# Patient Record
Sex: MALE | Race: WHITE | NOT HISPANIC OR LATINO | Employment: FULL TIME | URBAN - METROPOLITAN AREA
[De-identification: names, ages, dates, MRNs, and addresses within clinical notes are randomized per-mention and may not be internally consistent; named-entity substitution may affect disease eponyms.]

---

## 2020-09-30 ENCOUNTER — TRANSFERRED RECORDS (OUTPATIENT)
Dept: HEALTH INFORMATION MANAGEMENT | Facility: CLINIC | Age: 65
End: 2020-09-30

## 2020-10-01 ENCOUNTER — ANESTHESIA (OUTPATIENT)
Dept: SURGERY | Facility: HOSPITAL | Age: 65
End: 2020-10-01
Payer: COMMERCIAL

## 2020-10-01 ENCOUNTER — HOSPITAL ENCOUNTER (OUTPATIENT)
Facility: HOSPITAL | Age: 65
Setting detail: OBSERVATION
Discharge: HOME | End: 2020-10-02
Attending: EMERGENCY MEDICINE | Admitting: INTERNAL MEDICINE
Payer: COMMERCIAL

## 2020-10-01 ENCOUNTER — ANESTHESIA EVENT (OUTPATIENT)
Dept: SURGERY | Facility: HOSPITAL | Age: 65
End: 2020-10-01
Payer: COMMERCIAL

## 2020-10-01 DIAGNOSIS — K92.2 GASTROINTESTINAL HEMORRHAGE, UNSPECIFIED GASTROINTESTINAL HEMORRHAGE TYPE: ICD-10-CM

## 2020-10-01 PROBLEM — I25.10 CORONARY ARTERY DISEASE INVOLVING NATIVE CORONARY ARTERY WITHOUT ANGINA PECTORIS: Status: ACTIVE | Noted: 2020-10-01

## 2020-10-01 LAB
ABO + RH BLD: NORMAL
ABO + RH BLD: NORMAL
ALBUMIN SERPL-MCNC: 2.7 G/DL (ref 3.4–5)
ALP SERPL-CCNC: 34 U/L (ref 40–150)
ALT SERPL W P-5'-P-CCNC: 35 U/L (ref 0–70)
ANION GAP SERPL CALCULATED.3IONS-SCNC: 6 MMOL/L (ref 3–14)
AST SERPL W P-5'-P-CCNC: 25 U/L (ref 0–45)
BASOPHILS # BLD AUTO: 0 10E9/L (ref 0–0.2)
BASOPHILS NFR BLD AUTO: 0.3 %
BILIRUB SERPL-MCNC: 0.2 MG/DL (ref 0.2–1.3)
BLD GP AB SCN SERPL QL: NORMAL
BLD PROD TYP BPU: NORMAL
BLD UNIT ID BPU: 0
BLD UNIT ID BPU: 0
BLOOD BANK CMNT PATIENT-IMP: NORMAL
BLOOD PRODUCT CODE: NORMAL
BLOOD PRODUCT CODE: NORMAL
BPU ID: NORMAL
BPU ID: NORMAL
BUN SERPL-MCNC: 29 MG/DL (ref 7–30)
CALCIUM SERPL-MCNC: 7.3 MG/DL (ref 8.5–10.1)
CHLORIDE SERPL-SCNC: 108 MMOL/L (ref 94–109)
CO2 SERPL-SCNC: 26 MMOL/L (ref 20–32)
CREAT SERPL-MCNC: 0.64 MG/DL (ref 0.66–1.25)
DIFFERENTIAL METHOD BLD: ABNORMAL
EOSINOPHIL # BLD AUTO: 0.1 10E9/L (ref 0–0.7)
EOSINOPHIL NFR BLD AUTO: 1.6 %
ERYTHROCYTE [DISTWIDTH] IN BLOOD BY AUTOMATED COUNT: 13.3 % (ref 10–15)
GFR SERPL CREATININE-BSD FRML MDRD: >90 ML/MIN/{1.73_M2}
GLUCOSE SERPL-MCNC: 181 MG/DL (ref 70–99)
HCT VFR BLD AUTO: 22 % (ref 40–53)
HEMOCCULT SP1 STL QL: POSITIVE
HGB BLD-MCNC: 7.3 G/DL (ref 13.3–17.7)
HGB BLD-MCNC: 7.8 G/DL (ref 13.3–17.7)
HGB BLD-MCNC: 8.2 G/DL (ref 13.3–17.7)
HGB BLD-MCNC: 8.8 G/DL (ref 13.3–17.7)
IMM GRANULOCYTES # BLD: 0.1 10E9/L (ref 0–0.4)
IMM GRANULOCYTES NFR BLD: 0.6 %
INR PPP: 1.02 (ref 0.86–1.14)
LABORATORY COMMENT REPORT: NORMAL
LACTATE BLD-SCNC: 0.6 MMOL/L (ref 0.7–2)
LACTATE BLD-SCNC: 2.7 MMOL/L (ref 0.7–2)
LYMPHOCYTES # BLD AUTO: 2.8 10E9/L (ref 0.8–5.3)
LYMPHOCYTES NFR BLD AUTO: 32.1 %
MCH RBC QN AUTO: 31.1 PG (ref 26.5–33)
MCHC RBC AUTO-ENTMCNC: 33.2 G/DL (ref 31.5–36.5)
MCV RBC AUTO: 94 FL (ref 78–100)
MONOCYTES # BLD AUTO: 0.8 10E9/L (ref 0–1.3)
MONOCYTES NFR BLD AUTO: 8.8 %
NEUTROPHILS # BLD AUTO: 4.9 10E9/L (ref 1.6–8.3)
NEUTROPHILS NFR BLD AUTO: 56.6 %
NRBC # BLD AUTO: 0 10*3/UL
NRBC BLD AUTO-RTO: 0 /100
NUM BPU REQUESTED: 2
PLATELET # BLD AUTO: 293 10E9/L (ref 150–450)
POTASSIUM SERPL-SCNC: 3.8 MMOL/L (ref 3.4–5.3)
PROT SERPL-MCNC: 5.1 G/DL (ref 6.8–8.8)
RBC # BLD AUTO: 2.35 10E12/L (ref 4.4–5.9)
SARS-COV-2 RNA SPEC QL NAA+PROBE: NEGATIVE
SARS-COV-2 RNA SPEC QL NAA+PROBE: NORMAL
SODIUM SERPL-SCNC: 140 MMOL/L (ref 133–144)
SPECIMEN EXP DATE BLD: NORMAL
SPECIMEN SOURCE: NORMAL
SPECIMEN SOURCE: NORMAL
TRANSFUSION STATUS PATIENT QL: NORMAL
WBC # BLD AUTO: 8.7 10E9/L (ref 4–11)

## 2020-10-01 PROCEDURE — G0378 HOSPITAL OBSERVATION PER HR: HCPCS

## 2020-10-01 PROCEDURE — 86900 BLOOD TYPING SEROLOGIC ABO: CPT | Performed by: EMERGENCY MEDICINE

## 2020-10-01 PROCEDURE — 258N000003 HC RX IP 258 OP 636: Performed by: SURGERY

## 2020-10-01 PROCEDURE — 86920 COMPATIBILITY TEST SPIN: CPT | Performed by: EMERGENCY MEDICINE

## 2020-10-01 PROCEDURE — 93005 ELECTROCARDIOGRAM TRACING: CPT | Mod: 59

## 2020-10-01 PROCEDURE — 250N000011 HC RX IP 250 OP 636: Performed by: SURGERY

## 2020-10-01 PROCEDURE — C9113 INJ PANTOPRAZOLE SODIUM, VIA: HCPCS | Performed by: INTERNAL MEDICINE

## 2020-10-01 PROCEDURE — 85025 COMPLETE CBC W/AUTO DIFF WBC: CPT | Performed by: EMERGENCY MEDICINE

## 2020-10-01 PROCEDURE — P9016 RBC LEUKOCYTES REDUCED: HCPCS | Performed by: EMERGENCY MEDICINE

## 2020-10-01 PROCEDURE — 86901 BLOOD TYPING SEROLOGIC RH(D): CPT | Performed by: EMERGENCY MEDICINE

## 2020-10-01 PROCEDURE — 43239 EGD BIOPSY SINGLE/MULTIPLE: CPT | Performed by: NURSE ANESTHETIST, CERTIFIED REGISTERED

## 2020-10-01 PROCEDURE — 250N000011 HC RX IP 250 OP 636: Performed by: INTERNAL MEDICINE

## 2020-10-01 PROCEDURE — 43235 EGD DIAGNOSTIC BRUSH WASH: CPT | Performed by: SURGERY

## 2020-10-01 PROCEDURE — 36415 COLL VENOUS BLD VENIPUNCTURE: CPT | Performed by: INTERNAL MEDICINE

## 2020-10-01 PROCEDURE — 99220 PR INITIAL OBSERVATION CARE,LEVEL III: CPT | Mod: 25 | Performed by: INTERNAL MEDICINE

## 2020-10-01 PROCEDURE — 87635 SARS-COV-2 COVID-19 AMP PRB: CPT | Performed by: EMERGENCY MEDICINE

## 2020-10-01 PROCEDURE — C9113 INJ PANTOPRAZOLE SODIUM, VIA: HCPCS | Performed by: SURGERY

## 2020-10-01 PROCEDURE — 99203 OFFICE O/P NEW LOW 30 MIN: CPT | Mod: 25 | Performed by: SURGERY

## 2020-10-01 PROCEDURE — 99285 EMERGENCY DEPT VISIT HI MDM: CPT | Mod: 25

## 2020-10-01 PROCEDURE — 200N000001 HC R&B ICU

## 2020-10-01 PROCEDURE — 272N000001 HC OR GENERAL SUPPLY STERILE: Performed by: SURGERY

## 2020-10-01 PROCEDURE — 86850 RBC ANTIBODY SCREEN: CPT | Performed by: EMERGENCY MEDICINE

## 2020-10-01 PROCEDURE — 83605 ASSAY OF LACTIC ACID: CPT | Performed by: INTERNAL MEDICINE

## 2020-10-01 PROCEDURE — 370N000002 HC ANESTHESIA TECHNICAL FEE, EACH ADDTL 15 MIN: Performed by: SURGERY

## 2020-10-01 PROCEDURE — 36430 TRANSFUSION BLD/BLD COMPNT: CPT | Mod: 59

## 2020-10-01 PROCEDURE — 85610 PROTHROMBIN TIME: CPT | Performed by: EMERGENCY MEDICINE

## 2020-10-01 PROCEDURE — 82274 ASSAY TEST FOR BLOOD FECAL: CPT | Performed by: EMERGENCY MEDICINE

## 2020-10-01 PROCEDURE — 258N000003 HC RX IP 258 OP 636: Performed by: EMERGENCY MEDICINE

## 2020-10-01 PROCEDURE — 370N000001 HC ANESTHESIA TECHNICAL FEE, 1ST 30 MIN: Performed by: SURGERY

## 2020-10-01 PROCEDURE — 80053 COMPREHEN METABOLIC PANEL: CPT | Performed by: EMERGENCY MEDICINE

## 2020-10-01 PROCEDURE — 83605 ASSAY OF LACTIC ACID: CPT | Performed by: EMERGENCY MEDICINE

## 2020-10-01 PROCEDURE — 258N000003 HC RX IP 258 OP 636: Performed by: INTERNAL MEDICINE

## 2020-10-01 PROCEDURE — 96374 THER/PROPH/DIAG INJ IV PUSH: CPT | Mod: 59

## 2020-10-01 PROCEDURE — 999N000136 HC STATISTIC PRE PROC ASSESS II: Performed by: SURGERY

## 2020-10-01 PROCEDURE — 93010 ELECTROCARDIOGRAM REPORT: CPT | Performed by: INTERNAL MEDICINE

## 2020-10-01 PROCEDURE — 258N000003 HC RX IP 258 OP 636: Performed by: NURSE ANESTHETIST, CERTIFIED REGISTERED

## 2020-10-01 PROCEDURE — 85018 HEMOGLOBIN: CPT | Mod: 91 | Performed by: INTERNAL MEDICINE

## 2020-10-01 PROCEDURE — 250N000009 HC RX 250: Performed by: NURSE ANESTHETIST, CERTIFIED REGISTERED

## 2020-10-01 PROCEDURE — 250N000011 HC RX IP 250 OP 636: Performed by: NURSE ANESTHETIST, CERTIFIED REGISTERED

## 2020-10-01 PROCEDURE — C9803 HOPD COVID-19 SPEC COLLECT: HCPCS

## 2020-10-01 PROCEDURE — 99285 EMERGENCY DEPT VISIT HI MDM: CPT | Performed by: EMERGENCY MEDICINE

## 2020-10-01 PROCEDURE — 360N000014 HC SURGERY LEVEL 2 1ST 30 MIN: Performed by: SURGERY

## 2020-10-01 PROCEDURE — 85018 HEMOGLOBIN: CPT | Performed by: INTERNAL MEDICINE

## 2020-10-01 RX ORDER — ASPIRIN 81 MG/1
81 TABLET ORAL DAILY
Status: ON HOLD | COMMUNITY
End: 2020-10-02

## 2020-10-01 RX ORDER — CHLORAL HYDRATE 500 MG
1 CAPSULE ORAL DAILY
COMMUNITY

## 2020-10-01 RX ORDER — PROPOFOL 10 MG/ML
INJECTION, EMULSION INTRAVENOUS PRN
Status: DISCONTINUED | OUTPATIENT
Start: 2020-10-01 | End: 2020-10-01

## 2020-10-01 RX ORDER — SODIUM CHLORIDE 9 MG/ML
INJECTION, SOLUTION INTRAVENOUS CONTINUOUS
Status: DISCONTINUED | OUTPATIENT
Start: 2020-10-01 | End: 2020-10-02 | Stop reason: HOSPADM

## 2020-10-01 RX ORDER — SUCRALFATE 1 G/1
1 TABLET ORAL 4 TIMES DAILY
COMMUNITY

## 2020-10-01 RX ORDER — SODIUM CHLORIDE, SODIUM LACTATE, POTASSIUM CHLORIDE, CALCIUM CHLORIDE 600; 310; 30; 20 MG/100ML; MG/100ML; MG/100ML; MG/100ML
INJECTION, SOLUTION INTRAVENOUS CONTINUOUS
Status: DISCONTINUED | OUTPATIENT
Start: 2020-10-01 | End: 2020-10-02 | Stop reason: HOSPADM

## 2020-10-01 RX ORDER — SODIUM CHLORIDE, SODIUM LACTATE, POTASSIUM CHLORIDE, CALCIUM CHLORIDE 600; 310; 30; 20 MG/100ML; MG/100ML; MG/100ML; MG/100ML
INJECTION, SOLUTION INTRAVENOUS CONTINUOUS
Status: CANCELLED | OUTPATIENT
Start: 2020-10-01

## 2020-10-01 RX ORDER — MULTIVITAMIN,THERAPEUTIC
1 TABLET ORAL DAILY
COMMUNITY

## 2020-10-01 RX ORDER — NALOXONE HYDROCHLORIDE 0.4 MG/ML
.1-.4 INJECTION, SOLUTION INTRAMUSCULAR; INTRAVENOUS; SUBCUTANEOUS
Status: CANCELLED | OUTPATIENT
Start: 2020-10-01 | End: 2020-10-02

## 2020-10-01 RX ORDER — LIDOCAINE HYDROCHLORIDE 20 MG/ML
INJECTION, SOLUTION INFILTRATION; PERINEURAL PRN
Status: DISCONTINUED | OUTPATIENT
Start: 2020-10-01 | End: 2020-10-01

## 2020-10-01 RX ORDER — SODIUM CHLORIDE 9 MG/ML
INJECTION, SOLUTION INTRAVENOUS CONTINUOUS PRN
Status: DISCONTINUED | OUTPATIENT
Start: 2020-10-01 | End: 2020-10-01

## 2020-10-01 RX ORDER — FENTANYL CITRATE 50 UG/ML
INJECTION, SOLUTION INTRAMUSCULAR; INTRAVENOUS PRN
Status: DISCONTINUED | OUTPATIENT
Start: 2020-10-01 | End: 2020-10-01

## 2020-10-01 RX ORDER — MEPERIDINE HYDROCHLORIDE 25 MG/ML
12.5 INJECTION INTRAMUSCULAR; INTRAVENOUS; SUBCUTANEOUS
Status: CANCELLED | OUTPATIENT
Start: 2020-10-01

## 2020-10-01 RX ORDER — ONDANSETRON 4 MG/1
4 TABLET, ORALLY DISINTEGRATING ORAL EVERY 30 MIN PRN
Status: CANCELLED | OUTPATIENT
Start: 2020-10-01

## 2020-10-01 RX ORDER — LIDOCAINE 40 MG/G
CREAM TOPICAL
Status: DISCONTINUED | OUTPATIENT
Start: 2020-10-01 | End: 2020-10-02 | Stop reason: HOSPADM

## 2020-10-01 RX ORDER — NIACIN 500 MG/1
500 TABLET, EXTENDED RELEASE ORAL 2 TIMES DAILY
COMMUNITY

## 2020-10-01 RX ORDER — NITROGLYCERIN 0.4 MG/1
0.4 TABLET SUBLINGUAL EVERY 5 MIN PRN
COMMUNITY

## 2020-10-01 RX ORDER — NALOXONE HYDROCHLORIDE 0.4 MG/ML
.1-.4 INJECTION, SOLUTION INTRAMUSCULAR; INTRAVENOUS; SUBCUTANEOUS
Status: DISCONTINUED | OUTPATIENT
Start: 2020-10-01 | End: 2020-10-02 | Stop reason: HOSPADM

## 2020-10-01 RX ORDER — ONDANSETRON 2 MG/ML
4 INJECTION INTRAMUSCULAR; INTRAVENOUS EVERY 30 MIN PRN
Status: CANCELLED | OUTPATIENT
Start: 2020-10-01

## 2020-10-01 RX ORDER — LIDOCAINE 40 MG/G
CREAM TOPICAL
Status: DISCONTINUED | OUTPATIENT
Start: 2020-10-01 | End: 2020-10-01

## 2020-10-01 RX ADMIN — LIDOCAINE HYDROCHLORIDE 40 MG: 20 INJECTION, SOLUTION INFILTRATION; PERINEURAL at 13:57

## 2020-10-01 RX ADMIN — SODIUM CHLORIDE: 9 INJECTION, SOLUTION INTRAVENOUS at 15:16

## 2020-10-01 RX ADMIN — PROPOFOL 50 MG: 10 INJECTION, EMULSION INTRAVENOUS at 13:59

## 2020-10-01 RX ADMIN — FENTANYL CITRATE 100 MCG: 50 INJECTION, SOLUTION INTRAMUSCULAR; INTRAVENOUS at 13:57

## 2020-10-01 RX ADMIN — PANTOPRAZOLE SODIUM 40 MG: 40 INJECTION, POWDER, FOR SOLUTION INTRAVENOUS at 12:03

## 2020-10-01 RX ADMIN — SODIUM CHLORIDE: 9 INJECTION, SOLUTION INTRAVENOUS at 13:55

## 2020-10-01 RX ADMIN — SODIUM CHLORIDE 1000 ML: 9 INJECTION, SOLUTION INTRAVENOUS at 06:01

## 2020-10-01 RX ADMIN — FENTANYL CITRATE 50 MCG: 50 INJECTION, SOLUTION INTRAMUSCULAR; INTRAVENOUS at 14:04

## 2020-10-01 ASSESSMENT — ENCOUNTER SYMPTOMS
NECK STIFFNESS: 0
ALLERGIC/IMMUNOLOGIC NEGATIVE: 1
MUSCULOSKELETAL NEGATIVE: 1
CONSTITUTIONAL NEGATIVE: 1
LIGHT-HEADEDNESS: 1
SLEEP DISTURBANCE: 0
NERVOUS/ANXIOUS: 0
PSYCHIATRIC NEGATIVE: 1
CARDIOVASCULAR NEGATIVE: 1
GASTROINTESTINAL NEGATIVE: 1
EYES NEGATIVE: 1
MYALGIAS: 0
FEVER: 0
CHILLS: 0
ENDOCRINE NEGATIVE: 1
NECK PAIN: 0
RESPIRATORY NEGATIVE: 1
PHOTOPHOBIA: 0

## 2020-10-01 ASSESSMENT — ACTIVITIES OF DAILY LIVING (ADL)
ADLS_ACUITY_SCORE: 16

## 2020-10-01 ASSESSMENT — MIFFLIN-ST. JEOR: SCORE: 1480.75

## 2020-10-01 NOTE — ED NOTES
Patient signed release of information for [facility]'s to fax yesterday's clinic records and lab results.

## 2020-10-01 NOTE — CONSULTS
"Consult  10/1/2020    Patient: Carlos Grider    Admitting Physician: Dr. Chacho Dye    Admitting diagnosis: Gastrointestinal hemorrhage, unspecified gastrointestinal hemorrhage type [K92.2]    Reason for consult: GI bleeding: Melena    This is a 65 year old male with Melena.  This has been going of for 2 day(s).  Patient does not have abdominal pain.  Patient has been lightheaded.  Patient has not passed out.        IS on anticoagulation agents    Asa 81 mg daily and Plavix     Does currently show signs of active bleeding.   IS hemodynamically stable     Has not had a CT.   Has not had a bleeding scan.   Current HGB:   Recent Labs   Lab Test 10/01/20  0916   HGB 6.5*           Does have a  history of GI bleeding in the past   Positive history of positive history of gastric ulcer   Most recent EGD: 2015    Most recent colonoscopy: 2015    Past Medical History:  Past Medical History:   Diagnosis Date     CAD (coronary artery disease)     2 stents.  Last one was placed in 2010--[facility].  Dr green.     GI bleed 2006    EGD, Colonoscopy       Past Surgical History:  No past surgical history on file.    Family History History:  No family history on file.    History of Tobacco Use:  History   Smoking Status     Never Smoker   Smokeless Tobacco     Never Used       Current Medications:  No current outpatient medications on file.       Allergies:  No Known Allergies    ROS:  Constitutional: negative  Eyes: negative  Ears, nose, mouth, throat, and face: negative  Respiratory: negative  Cardiovascular: history of stents  Gastrointestinal: anemia, melena  Genitourinary:negative  Integument/breast: negative  Hematologic/lymphatic: negative  Musculoskeletal:negative  Neurological: negative  Behvioral/Psych: negative  Endocrine: negative  Allergic/Immunologic: negative    PHYSICAL EXAM:     Vital signs: /65   Pulse 85   Temp 96.6  F (35.9  C) (Tympanic)   Resp 18   Ht 1.676 m (5' 6\")   Wt 75.8 kg (167 lb " 1.7 oz)   SpO2 96%   BMI 26.97 kg/m     BMI: Body mass index is 26.97 kg/m .   General: Normal, healthy, cooperative, in no acute distress, alert   Skin: no rashes   Lungs: clear to auscultation   CV: Regular rate and rhythm without murmur   Abdominal: abdomen is soft without significant tenderness   Extremities: No cyanosis, clubbing or edema noted bilaterally in Upper and Lower Extremities   Neurological: without deficit    CBC RESULTS:   Recent Labs   Lab Test 10/01/20  0916 10/01/20  0558 09/22/15  1104 03/29/15  0445   WBC  --  8.7 9.9 8.7   RBC  --  2.35* 4.50 2.75*   HGB 6.5* 7.3* 14.1 8.0*   HCT  --  22.0* 42.2 25.4*   MCV  --  94 94 92   MCH  --  31.1 31.3 29.1   MCHC  --  33.2 33.4 31.5   RDW  --  13.3 13.5 13.5   PLT  --  293 368 286       Last Comprehensive Metabolic Panel:  Sodium   Date Value Ref Range Status   10/01/2020 140 133 - 144 mmol/L Final     Potassium   Date Value Ref Range Status   10/01/2020 3.8 3.4 - 5.3 mmol/L Final     Chloride   Date Value Ref Range Status   10/01/2020 108 94 - 109 mmol/L Final     Carbon Dioxide   Date Value Ref Range Status   10/01/2020 26 20 - 32 mmol/L Final     Anion Gap   Date Value Ref Range Status   10/01/2020 6 3 - 14 mmol/L Final     Glucose   Date Value Ref Range Status   10/01/2020 181 (H) 70 - 99 mg/dL Final     Urea Nitrogen   Date Value Ref Range Status   10/01/2020 29 7 - 30 mg/dL Final     Creatinine   Date Value Ref Range Status   10/01/2020 0.64 (L) 0.66 - 1.25 mg/dL Final     GFR Estimate   Date Value Ref Range Status   10/01/2020 >90 >60 mL/min/[1.73_m2] Final     Comment:     Non  GFR Calc  Starting 12/18/2018, serum creatinine based estimated GFR (eGFR) will be   calculated using the Chronic Kidney Disease Epidemiology Collaboration   (CKD-EPI) equation.       Calcium   Date Value Ref Range Status   10/01/2020 7.3 (L) 8.5 - 10.1 mg/dL Final     Bilirubin Total   Date Value Ref Range Status   10/01/2020 0.2 0.2 - 1.3 mg/dL Final      Alkaline Phosphatase   Date Value Ref Range Status   10/01/2020 34 (L) 40 - 150 U/L Final     ALT   Date Value Ref Range Status   10/01/2020 35 0 - 70 U/L Final     AST   Date Value Ref Range Status   10/01/2020 25 0 - 45 U/L Final       ASSESSMENT: 65 year old male with most likely upper GI bleeding.        IS on anticoagulation agents    Asa 81 mg daily and Plavix     Does currently show signs of active bleeding.   IS hemodynamically stable    PLAN:   EGD today

## 2020-10-01 NOTE — PLAN OF CARE
[facility] Inpatient Admission Note:    Patient admitted to 3122/3122-1 at approximately 0900 via cart accompanied by nurse from emergency room . Report received from Lissy in SBAR format at 0900 via face to face in room. Patient ambulated to bed via self.. Patient is alert and oriented X 3, denies pain; rates at 0 on 0-10 scale.  Patient oriented to room, unit, hourly rounding, and plan of care. Explained admission packet and patient handbook with patient bill of rights brochure. Will continue to monitor and document as needed.     Inpatient Nursing criteria listed below was met:    Health care directives status obtained and documented: Yes    Care Everywhere authorization obtained No    MRSA swab completed for patient 65 years and older: N/A    Patient identifies a surrogate decision maker: Yes If yes, who:Ceci-wife Contact Information:[phone]     If initial lactic acid >2.0, repeat lactic acid drawn within one hour of arrival to unit: No. If no, state reason: Plan to get w/ need Hgb draw    Vaccination assessment and education completed: Yes   Vaccinations received prior to admission: Pneumovax no  Influenza(seasonal)  NO   Vaccination(s) ordered: influenza vaccine    Clergy visit ordered if patient requests: N/A    Skin issues/needs documented: N/A    Isolation Patient: no Education given, correct sign in place and documentation row added to PCS:   N/A    Fall Prevention Yes: Care plan updated, education given and documented, sticker and magnet in place: Yes    Care Plan initiated: Yes    Education Documented (including assessment): Yes    Patient has discharge needs : No If yes, please explain:N/A

## 2020-10-01 NOTE — PLAN OF CARE
Prior to Admission Medication Reconciliation:     Medications added:   [] None  [x] As listed below:    Multivitamin, fish oil and nitrostat- pt reported    Medications deleted:   [x] None  [] As listed below:    Changes made to existing medications:   [] None  [x] As listed below:    Updated strengths and frequencies    Last times/dates taken verified with patient:  [x] Yes- completed myself  [] Nurse completed no changes made  [] Unable to review with patient:  [] Nurse completed/changes made:       Allergy modifications:    [x]Did not review  []Patient verified NKA  []Patient verified current existing allergies: no changes made  []New allergies: listed below      Medication reconciliation sources:   [x]Patient  []Patient family member/emergency contact: **  [x][facility] Review:    Home Medications     - Last Reconciled 09/30/20 by Thu Browning CMA    [x]amlodipine 10 mg tablet 10 mg PO DAILY   [x]aspirin 81 mg tablet,delayed release (Adult Low Dose Aspirin) 81 mg PO DAILY   [x]clopidogrel 75 mg tablet 75 mg PO DAILY   [x]multivitamin-minerals-lutein  1 tab PO DAILY   [x]niacin 500 mg tablet,extended release 24 hr (Niaspan) 500 mg PO BID   [x]nitroglycerin 0.4 mg sublingual tablet 0.4 mg sublingual Q5M PRN   [x]pantoprazole 40 mg tablet,delayed release 40 mg PO DAILY   [x]rosuvastatin 10 mg tablet 10 mg PO DAILY   [x]sucralfate 1 gram tablet TAKE ONE TABLET BY MOUTH 4 TIMES DAILY ON EMPTY STOMACH Members ID # [id number]   []testosterone 1.62 % (40.5 mg/2.5 gram) transdermal gel packet  1 packet transdermal QAM   [x]testosterone 1.62 % (20.25 mg/1.25 gram) transdermal gel packet (AndroGel)  1 packet transdermal QAM 90 days   [x]valsartan 80 mg tablet n80 mg PO DAILY   []Epic Chart Review  []Care Everywhere review  [x]Pharmacy med list: [facility] Mail order- [facility]    Name Strength Instructions Last Fill Date QTY/DS Notes   [] rosuvastatin 10 mg  daily 9/25/20 90    [] clopidogrel 75 mg daily 9/25/20 90    []  pantoprazole 40 mg daily 9/4/20 90    [] amlodipine 10mg daily 9/4/20 90    [] sucralfate 1 gm QID on empty stomach 9/4/20 360    [] valsartan 80 mg daily 7/22/20 90    [] Testosterone gel 1.62%,  1 packet qam 7/8/20 90 ds    [] Niacin er 500 mg bid 7/15/20 180    []Pharmacy phone call  []Outside meds dispense report  []Nursing home or Assisted Living MAR:  []Other: **    Pharmacy desired at discharge: [facility]    Is patient on coumadin?  [x]No      Requests for consultation by provider or pharmacist:   [x] Patient understands why all of their meds were prescribed and how to take them. No questions.   [x] Fill dates coincide with compliancy for all maintenance meds.   [] Fill dates do not coincide with compliancy with maintenance meds. See notes in PTA medlist about how patient is taking.   [] Patient has questions about the following:      Comments: pt alert and oriented and manages his own meds. No concerns. Denies taking any other medications aside from those listed on his PTA meds on a daily basis.       Aliyah Vasquez on 10/1/2020 at 10:31 AM       Discrepancies: [x] No []Not Applicable []Yes: listed below    Time spent on medication reconciliation:   []5-20 mins  [x]20-40 mins  []> 40 mins    Issues completing PTA medication reconciliation:  [x] On hold for a long time  [] Waited for a call back  [] Fax didn't come through  [] Fax took a long time  [] Other:    Notifying appropriate party of changes/additions/discrepancies:  [x]No pertinent changes made, notification not necessary.   [] Notified attending provider via text page  [] Notified attending provider in person  [] Notified pharmacy  [] Notified nurse  [] Attending provider not available, left detailed notes  [] Changes/additions made don't need provider notification because provider has not seen patient or input any orders  [] Changes/additions made don't need provider notification because changes made are to medications not ordered    Medications Prior  to Admission   Medication Sig Dispense Refill Last Dose     amLODIPine (NORVASC) 10 MG tablet Take 10 mg by mouth daily    10/1/2020 at 0515     aspirin 81 MG EC tablet Take 81 mg by mouth daily   9/30/2020 at 0515     clopidogrel (PLAVIX) 75 MG tablet Take 75 mg by mouth daily    10/1/2020 at 0515     fish oil-omega-3 fatty acids 1000 MG capsule Take 1 g by mouth daily   9/30/2020 at 0515     multivitamin, therapeutic (THERA-VIT) TABS tablet Take 1 tablet by mouth daily   9/30/2020 at Unknown time     niacin ER (NIASPAN) 500 MG CR tablet Take 500 mg by mouth 2 times daily   9/30/2020 at 2200     pantoprazole (PROTONIX) 40 MG EC tablet Take 40 mg by mouth every morning (before breakfast)    9/30/2020 at 0515     rosuvastatin (CRESTOR) 10 MG tablet Take 10 mg by mouth daily    9/30/2020 at 2100     sucralfate (CARAFATE) 1 GM tablet Take 1 g by mouth 4 times daily on an empty stomach   9/30/2020 at 2100     Testosterone 20.25 MG/1.25GM (1.62%) GEL Place 1 packet onto the skin daily    9/30/2020 at 0515     valsartan (DIOVAN) 80 MG tablet Take 80 mg by mouth daily    10/1/2020 at 0515     nitroGLYcerin (NITROSTAT) 0.4 MG sublingual tablet Place 0.4 mg under the tongue every 5 minutes as needed for chest pain For chest pain place 1 tablet under the tongue every 5 minutes for 3 doses. If symptoms persist 5 minutes after 1st dose call 911.   Unknown at Unknown time

## 2020-10-01 NOTE — PROVIDER NOTIFICATION
DATE:  10/1/2020   TIME OF RECEIPT FROM LAB:  0938  LAB TEST:  Hgb  LAB VALUE:  6.5  RESULTS GIVEN WITH READ-BACK TO (PROVIDER):  Dr. Dye  TIME LAB VALUE REPORTED TO PROVIDER:   0931

## 2020-10-01 NOTE — PLAN OF CARE
A&O, very pleasant. VS as charted. Afebrile. Denies pain. LS clear, denies sob. C/o slight dizziness on standing, clears quickly. BS active, denies nausea, Loose bloody stool x1 this shift. Hgb monitored with 6.5 the lowest and 8.8 following 2 units of RBC. Pt to surgery for an upper scope with/out a source of bleeding found. SBA to transfer. IVF infusing and clear liquid diet tolerated well.    Face to face report given with opportunity to observe patient.    Report given to Agnes, RNs    Olive Briseno, RN   10/1/2020  6:47 PM

## 2020-10-01 NOTE — ED PROVIDER NOTES
History     Chief Complaint   Patient presents with     Rectal Bleeding     since yesterday. states stool mostly blood today. hgb 10 yesterday. pallor, diaphoresis, and dizziness. no abd pain or vomiting. takes plavix      HPI  Carlos Grider is a 65 year old male who presents today with complaints of rectal bleeding.  Patient states that he has had it for approximately 2 days.  Yesterday went to go see his doctor and had a hemoglobin of 10.  Patient states that since he has been home symptoms have worsened.  Patient denies any abdominal pain no vomiting.  Today he feels lightheaded and dizzy.  No additional complaints at this time.    Allergies:  No Known Allergies    Problem List:    Patient Active Problem List    Diagnosis Date Noted     Essential hypertension 07/23/2011     Priority: Medium     Overview:   IMO Update 10/11       Coronary atherosclerosis 09/18/2009     Priority: Medium     Overview:   06/13/2010 - coronary angiography is stable with no new obstructive disease, medical therapy recommended; however, patient did not follow through.  09/14/2009 - status post [place] drug-eluting stent, 3.0 x 15 mm to the mid LAD (IVUS).   01/2008 - cardiac catheterization with 20 to 30% LAD disease.   12/08/2010 - status post successful placement of an [place] drug-eluting stent, 3.0 x 15 mm to the left anterior descending artery.   08/02/2012 - unstable angina, cath with stable ASCAD, no new obstructive disease, and patent stents       Gastric ulcer 07/16/2008     Priority: Medium     Overview:   IMO Update 10/11       Intestinal infection due to Gram-negative bacteria 01/31/2008     Priority: Medium     Cholelithiasis 01/30/2008     Priority: Medium     Hyperlipidemia 01/30/2008     Priority: Medium     Overview:   IMO Update 10/11          Past Medical History:    Past Medical History:   Diagnosis Date     CAD (coronary artery disease)      GI bleed 2006       Past Surgical History:    No past surgical  history on file.    Family History:    No family history on file.    Social History:  Marital Status:   [2]  Social History     Tobacco Use     Smoking status: Never Smoker     Smokeless tobacco: Never Used   Substance Use Topics     Alcohol use: Yes     Comment: occ     Drug use: No        Medications:         AMLODIPINE BESYLATE PO       ASPIRIN PO       CLOPIDOGREL BISULFATE PO       NIACIN, ANTIHYPERLIPIDEMIC, PO       Pantoprazole Sodium (PROTONIX PO)       Rosuvastatin Calcium (CRESTOR PO)       sucralfate (CARAFATE) 1 GM tablet       Valsartan (DIOVAN PO)       testosterone (ANDROGEL/TESTIM) 50 MG/5GM (1%) gel          Review of Systems   Constitutional: Negative.  Negative for chills and fever.   HENT: Negative.    Eyes: Negative.  Negative for photophobia.   Respiratory: Negative.    Cardiovascular: Negative.    Gastrointestinal: Negative.    Endocrine: Negative.    Genitourinary: Negative.    Musculoskeletal: Negative.  Negative for myalgias, neck pain and neck stiffness.   Skin: Negative.    Allergic/Immunologic: Negative.    Neurological: Positive for light-headedness.   Psychiatric/Behavioral: Negative.  Negative for self-injury, sleep disturbance and suicidal ideas. The patient is not nervous/anxious.        Physical Exam   BP: 124/75  Pulse: 102  Temp: 96.5  F (35.8  C)  Resp: 16  SpO2: 98 %      Physical Exam  Constitutional:       General: He is not in acute distress.     Appearance: Normal appearance. He is normal weight. He is not toxic-appearing.   HENT:      Head: Normocephalic and atraumatic.      Right Ear: Tympanic membrane normal.      Left Ear: Tympanic membrane normal.   Eyes:      Extraocular Movements: Extraocular movements intact.      Pupils: Pupils are equal, round, and reactive to light.   Neck:      Musculoskeletal: Normal range of motion.   Cardiovascular:      Rate and Rhythm: Normal rate and regular rhythm.      Pulses: Normal pulses.   Pulmonary:      Effort: Pulmonary  effort is normal.   Musculoskeletal: Normal range of motion.   Skin:     General: Skin is warm.      Capillary Refill: Capillary refill takes less than 2 seconds.      Coloration: Skin is pale (Lower extremities).   Neurological:      General: No focal deficit present.      Mental Status: He is alert and oriented to person, place, and time.   Psychiatric:         Mood and Affect: Mood normal.         Behavior: Behavior normal.         Thought Content: Thought content normal.         Judgment: Judgment normal.         ED Course     ED Course as of Oct 01 0717   Thu Oct 01, 2020   0659 Spoke with Dr. Alfaro.  He agrees that patient will need endoscopy.  Recommending admission on the medicine team but will do endoscopy today.       0707 Dr. Dye contacted. Case discussed. Plan: Admit.  Patient to be type and cross 2 units        Procedures          EKG - NSR without ST elevation.          Results for orders placed or performed during the hospital encounter of 10/01/20 (from the past 24 hour(s))   Comprehensive metabolic panel   Result Value Ref Range    Sodium 140 133 - 144 mmol/L    Potassium 3.8 3.4 - 5.3 mmol/L    Chloride 108 94 - 109 mmol/L    Carbon Dioxide 26 20 - 32 mmol/L    Anion Gap 6 3 - 14 mmol/L    Glucose 181 (H) 70 - 99 mg/dL    Urea Nitrogen 29 7 - 30 mg/dL    Creatinine 0.64 (L) 0.66 - 1.25 mg/dL    GFR Estimate >90 >60 mL/min/[1.73_m2]    GFR Estimate If Black >90 >60 mL/min/[1.73_m2]    Calcium 7.3 (L) 8.5 - 10.1 mg/dL    Bilirubin Total 0.2 0.2 - 1.3 mg/dL    Albumin 2.7 (L) 3.4 - 5.0 g/dL    Protein Total 5.1 (L) 6.8 - 8.8 g/dL    Alkaline Phosphatase 34 (L) 40 - 150 U/L    ALT 35 0 - 70 U/L    AST 25 0 - 45 U/L   CBC with platelets differential   Result Value Ref Range    WBC 8.7 4.0 - 11.0 10e9/L    RBC Count 2.35 (L) 4.4 - 5.9 10e12/L    Hemoglobin 7.3 (L) 13.3 - 17.7 g/dL    Hematocrit 22.0 (L) 40.0 - 53.0 %    MCV 94 78 - 100 fl    MCH 31.1 26.5 - 33.0 pg    MCHC 33.2 31.5 - 36.5 g/dL     RDW 13.3 10.0 - 15.0 %    Platelet Count 293 150 - 450 10e9/L    Diff Method Automated Method     % Neutrophils 56.6 %    % Lymphocytes 32.1 %    % Monocytes 8.8 %    % Eosinophils 1.6 %    % Basophils 0.3 %    % Immature Granulocytes 0.6 %    Nucleated RBCs 0 0 /100    Absolute Neutrophil 4.9 1.6 - 8.3 10e9/L    Absolute Lymphocytes 2.8 0.8 - 5.3 10e9/L    Absolute Monocytes 0.8 0.0 - 1.3 10e9/L    Absolute Eosinophils 0.1 0.0 - 0.7 10e9/L    Absolute Basophils 0.0 0.0 - 0.2 10e9/L    Abs Immature Granulocytes 0.1 0 - 0.4 10e9/L    Absolute Nucleated RBC 0.0    Lactic acid whole blood   Result Value Ref Range    Lactic Acid 2.7 (H) 0.7 - 2.0 mmol/L   INR   Result Value Ref Range    INR 1.02 0.86 - 1.14   Immunos occult blood   Result Value Ref Range    Occult Blood Slide 1 Positive (A) NEG^Negative       Medications   0.9% sodium chloride BOLUS (1,000 mLs Intravenous New Bag 10/1/20 0601)       Assessments & Plan (with Medical Decision Making)     65-year-old male who presents today with complaints of dark black tarry stools and bleeding per rectum.  Patient seen by PCP yesterday with a hemoglobin of 10.0.  Hemoglobin 7.3 today.  Patient with increasing symptoms prompting ER visit today.      Labs as above and as noted hemoglobin was low at 7.3.  Case discussed with the surgeon Dr. Alfaro.  He states that patient will have an endoscopy today.  Prefers patient to be admitted under the internal medicine team.  Dr. Dye, internist on-call, contacted and he agrees to admit.  Patient to be typed and crossed 2 units for now.  Patient to be admitted to the medical ICU.    Due to the nature of this electronic medical record, laboratory results, imaging results, diagnosis, other information and medications reported above may not represent information available to me at the the time of my care and disposition. Medications reported above may have not been ordered by me.     Portions of the record may have been created  with voice recognition software. Occasional wrong-word or 'sound-a- like' substitution may have occurred due to the inherent limitations of voice recognition software. Though the chart has been reviewed, there may be inadvertent transcription errors. Read the chart carefully and recognize, using context, where substitutions have occurred.       New Prescriptions    No medications on file       Final diagnoses:   Gastrointestinal hemorrhage, unspecified gastrointestinal hemorrhage type       10/1/2020   [facility]     Antonette Ledesma MD  10/02/20 2434

## 2020-10-01 NOTE — ANESTHESIA CARE TRANSFER NOTE
Patient: Carlos Grider    Procedure(s):  Upper endoscopy    Diagnosis: Gastrointestinal hemorrhage, unspecified gastrointestinal hemorrhage type [K92.2]  Diagnosis Additional Information: No value filed.    Anesthesia Type:   MAC     Note:  Airway :Nasal Cannula  Patient transferred to:ICU  ICU Handoff: Call for PAUSE to initiate/utilize ICU HANDOFF, Identified Patient, Identified Responsible Provider, Reviewed the Pertinent Medical History, Discussed Surgical Course, Reviewed Intra-OP Anesthesia Management and Issues during Anesthesia, Set Expectations for Post Procedure Period and Allowed Opportunity for Questions and Acknowledgement of Understanding      Vitals: (Last set prior to Anesthesia Care Transfer)    CRNA VITALS  10/1/2020 1339 - 10/1/2020 1426      10/1/2020             Resp Rate (set):  8                Electronically Signed By: ADRIANA Robetrs CRNA  October 1, 2020  2:26 PM

## 2020-10-01 NOTE — ANESTHESIA PREPROCEDURE EVALUATION
Anesthesia Pre-Procedure Evaluation    Patient: Carlos Grider   MRN: [id number] : 1955          Preoperative Diagnosis: Gastrointestinal hemorrhage, unspecified gastrointestinal hemorrhage type [K92.2]    Procedure(s):  Upper endoscopy    Past Medical History:   Diagnosis Date     CAD (coronary artery disease)     2 stents.  Last one was placed in [place] [place] [facility] [place]  Dr green.     GI bleed     EGD, Colonoscopy     No past surgical history on file.    Anesthesia Evaluation     . Pt has had prior anesthetic.            ROS/MED HX    ENT/Pulmonary:     (+)DELMA risk factors hypertension, , . .    Neurologic:  - neg neurologic ROS     Cardiovascular:     (+) Dyslipidemia, hypertension--CAD, --stent,  2 . : . . . :. . Previous cardiac testing date:results:date: results:ECG reviewed date:10/1/20 results: date: results:          METS/Exercise Tolerance:  >4 METS   Hematologic: Comments: 1 unit blood given today        Musculoskeletal:  - neg musculoskeletal ROS       GI/Hepatic:     (+) Other GI/Hepatic GI bleed      Renal/Genitourinary:  - ROS Renal section negative       Endo:  - neg endo ROS       Psychiatric:         Infectious Disease:  - neg infectious disease ROS       Malignancy:      - no malignancy   Other:    - neg other ROS                      Physical Exam  Normal systems: pulmonary and dental    Airway   Mallampati: II  TM distance: >3 FB  Neck ROM: full    Dental     Cardiovascular   Rhythm and rate: regular and normal    PE comment: Distant heart tones    Pulmonary    breath sounds clear to auscultation            Lab Results   Component Value Date    WBC 8.7 10/01/2020    HGB 7.3 (L) 10/01/2020    HCT 22.0 (L) 10/01/2020     10/01/2020    CRP <2.9 2015    SED 4 2015     10/01/2020    POTASSIUM 3.8 10/01/2020    CHLORIDE 108 10/01/2020    CO2 26 10/01/2020    BUN 29 10/01/2020    CR 0.64 (L) 10/01/2020     (H) 10/01/2020    ANGELO 7.3 (L) 10/01/2020    ALBUMIN  "2.7 (L) 10/01/2020    PROTTOTAL 5.1 (L) 10/01/2020    ALT 35 10/01/2020    AST 25 10/01/2020    ALKPHOS 34 (L) 10/01/2020    BILITOTAL 0.2 10/01/2020    INR 1.02 10/01/2020       Preop Vitals  BP Readings from Last 3 Encounters:   10/01/20 109/59   09/22/15 133/82   03/29/15 113/77    Pulse Readings from Last 3 Encounters:   10/01/20 77   09/22/15 70   03/29/15 82      Resp Readings from Last 3 Encounters:   10/01/20 14   09/22/15 18   03/29/15 16    SpO2 Readings from Last 3 Encounters:   10/01/20 95%   09/22/15 96%   03/29/15 96%      Temp Readings from Last 1 Encounters:   10/01/20 96.5  F (35.8  C) (Tympanic)    Ht Readings from Last 1 Encounters:   09/22/15 1.676 m (5' 6\")      Wt Readings from Last 1 Encounters:   09/22/15 72.6 kg (160 lb)    Estimated body mass index is 25.82 kg/m  as calculated from the following:    Height as of 9/22/15: 1.676 m (5' 6\").    Weight as of 9/22/15: 72.6 kg (160 lb).       Anesthesia Plan      History & Physical Review  History and physical reviewed and following examination; no interval change.    ASA Status:  3 .    NPO Status:  > 8 hours    Plan for MAC with Intravenous and Propofol induction. Maintenance will be TIVA.  Reason for MAC:  Deep or markedly invasive procedure (G8)    HP cosigned by Dr. Alfaro        Postoperative Care      Consents  Anesthetic plan, risks, benefits and alternatives discussed with:  Patient..                 Dimas Dumont, ADRIANA CRNA  "

## 2020-10-01 NOTE — OP NOTE
REPORT OF OPERATION  DATE OF PROCEDURE: 10/1/2020    PATIENT: Carlos Grider    SURGERY PREFORMED: Esophagogastroduodenoscopy    PREOPERATIVE DIAGNOSIS: Melena    POSTOPERATIVE DIAGNOSIS:    Normal Esophagogastroduodenoscopy   Squamous columnar junction at 40    SURGEON: Anirudh Alfaro MD    ASSISTANTS: None    ANESTHESIA: Monitored Anesthesia Care    COMPLICATIONS: None apparent    TRANSFUSIONS: None    TISSUE TO PATHOLOGY: None    FINDINGS: Normal Esophagogastroduodenoscopy    INDICATIONS: This is a 65 year old male in need of an Esophagogastroduodenoscopy for Melena.  The patient will be taken to the endoscopy suite for that procedure.    DESCRIPTIONS OF PROCEDURE IN DETAIL: After consent was obtained the patient was taken to the operative suite and jacky in the left lateral decubitus position.  The patient was identified and the correct patient was confirmed. Monitored Anesthesia Care was given by anesthesia. A time out was preformed verifying the correct patient and the correct procedure.  The entire operative team was in agreement.  All necessary equipment and supplies were in the room.    The endoscope was inserted into the mouth and passed without difficulty to the third portion of the duodenum.  Duodenal biopsies were not taken.  The endoscope was then withdrawn through the duodenum, the duodenal bulb and pyloric channel and no abnormalities were noted.  The endoscope was brought back into the stomach and antral biopsies were not obtained.  The endoscope was then retroflexed and no lesions of the fundus body or antrum were seen.  The endoscope was straightened back out and brought into the distal esophagus and a well-defined squamocolumnar junction was identified at 40 cm. Biopsies of the distal esophagus were not taken.  The endoscope was slowly withdrawn through the remaining esophagus no other abnormalities are seen,  The endoscope was withdrawn from the patient the patient was then taken to the  recovery room in stable condition tolerated the procedure well.

## 2020-10-01 NOTE — ANESTHESIA POSTPROCEDURE EVALUATION
Patient: Carlos Grider    Procedure(s):  Upper endoscopy    Diagnosis:Gastrointestinal hemorrhage, unspecified gastrointestinal hemorrhage type [K92.2]  Diagnosis Additional Information: No value filed.    Anesthesia Type:  MAC    Note:  Anesthesia Post Evaluation    Patient location during evaluation: Bedside  Patient participation: Able to fully participate in evaluation  Level of consciousness: awake  Pain management: adequate  Airway patency: patent  Cardiovascular status: acceptable  Respiratory status: acceptable  Hydration status: acceptable  PONV: none     Anesthetic complications: None          Last vitals:  Vitals:    10/01/20 1315 10/01/20 1330 10/01/20 1345   BP: 111/70 126/64 129/67   Pulse: 78 76 92   Resp: 16 18    Temp:      SpO2: 96% 96% 97%         Electronically Signed By: ADRIANA Andrea CRNA  October 1, 2020  2:49 PM

## 2020-10-01 NOTE — OR NURSING
Pt brought down to [facility] from ICU for upper endoscopy. Blood transfusion in progress. VSS. Afebrile. Offers no complaints. Will continue to monitor.

## 2020-10-01 NOTE — H&P
Admitted:     10/01/2020      CHIEF COMPLAINT:  Rectal bleeding.      HISTORY OF PRESENT ILLNESS:  Carlos is a 65-year-old gentleman who basically has been in good health.  He has had no major troubles.  He is very active, exercising, etc.  He presented to his primary care clinic yesterday with complaints of feeling somewhat weak and started to have some black stools over the last couple of days.  Has a history of previous ulcers and felt that he probably was bleeding once again.  Been under a fair amount of stress, he states.  At any rate, during that visit yesterday, 09/30, his blood pressure was 110/70, pulse was 102.  Sats were normal.  His exam was unremarkable.  His hemoglobin was 10.5.  He was recommended to continue with his Prilosec and Carafate 4 times a day and that if he had any worsening troubles to seek evaluation; otherwise, follow up hemoglobin in a while.  His last hemoglobin was 13.8 back in 07/2019 and it was 10.0 yesterday.  The patient did well and this morning he started to have a couple episodes of very loose sort of dark blood.  Felt a little shaky, a little lightheaded, and came to the ER for evaluation.  No chest pains, no abnormal shortness of breath at all.  Does take aspirin and Plavix for previous coronary artery disease.  No Advil at all.  Is not a drinker.  When he got here, heart rate was 102.  Afebrile.  Pressure was 120/75, sats 98% on room air.  He had no abdominal pain.  His lab work showed that his hemoglobin was now 7.3, platelet count was 293,000.  INR was 1.02.  BUN and creatinine were 29 and 0.64.  His occult blood was positive.  Lactic acid was 2.7.  The patient has not eaten since last evening.  They did call Dr. Alfaro, our surgeon on-call, and stated that he would scope the patient.      As I am seeing the patient down in the ER, he is pleasant, alert, oriented, having no real distress at all.  Currently no abdominal pain.  States he has had no real troubles with bleeding  or issues for quite some time.  Back in 2015 he had some GI bleeding.  He has had history of a gastric ulcer in the past.  His last round of bleeding, they scoped him from above, saw nothing.  They scoped his colon.  He had blood in his colon, but they could not find anything.  He had a capsule endoscopy, which was not all that exact.  It did show some small bowel diverticula, 2 small nonbleeding AV malformations, a diffuse area of lymphangiectasia.  Since that time he has not had any further episodes and hemoglobin has been stable, per his report.      He has not been exposed to anybody who has had COVID.  He has had no recent fevers, chills, sweats, cough, purulent sputum.  No heartburn.  No current abdominal pain.  No difficulty voiding.  No skin rashes, swelling, joint pains at all.  Has not been traveling.  Otherwise, he has felt well.  He is normally very active, out running.      PAST MEDICAL HISTORY:  Significant for:   1.  Coronary artery disease.  He has had an LAD stent placed x2.  Initially back in 09/2009 he had a stent to his LAD.  The rest of his vessels were normal.  12/2010 he had a stent placed in the same area in his LAD, otherwise vessels were normal.  Last angiogram was 2012, stent was patent, normal systolic function, no obstructive coronary artery disease.  Has continued both the Plavix and aspirin.  Dr. Gamez back at that time said he could consider stopping his Plavix.   2.  History of basal cell CA of the left side of the nose.   3.  History of hypertension.   4.  Mixed hyperlipidemia.   5.  He is status post cholecystectomy.      ALLERGIES:  NONE.      MEDICATIONS:   1.  Amlodipine 10 mg daily.   2.  Valsartan 80 mg daily.   3.  Plavix 75 mg daily.   4.  Aspirin 81 mg daily.   5.  Protonix 40 mg daily.   6.  Rosuvastatin 10 mg daily.   7.  Sucralfate 1 gram q.i.d.   8.  Testosterone transdermal gel pack.      FAMILY HISTORY:  Positive for heart disease in his father.  Also mom had  diabetes.      SOCIAL HISTORY:  Nonsmoker, , 5 kids, retired.      REVIEW OF SYSTEMS:  The pertinent positives and negatives are noted above in the HPI.      PHYSICAL EXAMINATION:   GENERAL:  Alert, pleasant gentleman in no obvious distress.   VITAL SIGNS:  Most recent blood pressure currently is 109/59, pulse is 77, afebrile.  Sats 95% on room air.   HEENT:  Normocephalic, atraumatic.  Pupils equal, round, reactive.  Sclerae are clear.   NECK:  Supple, no obvious lymphadenopathy or thyromegaly.  Mucous membranes are moist.   LUNGS:  Clear to auscultation.   CARDIAC:  Regular rate and rhythm, normal S1, S2, no audible murmurs, rubs or gallops.  Neck veins are flat.  No carotid bruits.   ABDOMEN:  Soft, nontender, no masses or organomegaly.   EXTREMITIES:  Without cyanosis, clubbing or edema.   NEUROLOGIC:  Alert and oriented x 3.  Cranial nerves are intact.  Nonfocal exam.      LABORATORY DATA:  Shows his sodium is 140, potassium 3.8, chloride 108, CO2 is 26, BUN is 29, creatinine is 0.64, calcium is 7.3, albumin is 2.7, total bilirubin is 0.2, alk phos 34, ALT 35, AST 25.  Lactic acid 2.7, positive for Hemoccult.  Blood sugar was 181.  White count 8700, hemoglobin 7.3, platelet count is 293,000.  His MCV is 94.  INR is 1.02.      ASSESSMENT:   1.  Acute gastrointestinal bleed.  The patient has had some black stools over the last couple of days and then followed by some dark blood this morning, somewhat symptomatic.  Hemoglobin is 7.3, yesterday it was 10.  His usual baseline is in the 12-13 range.  Has had upper GI bleed in the past; last episode was 2015 of unclear source despite endoscopy, colonoscopy and capsule enteroscopy.  Continues to take aspirin and Plavix.  He is hemodynamically stable currently.  He will be admitted to the hospital.  Surgery consult has been obtained.  He will undergo endoscopy today.  We will make sure he is n.p.o.  We will do some serial hemoglobins.  I anticipate he will  require transfusions as he is 7.3 currently.  He has been typed and crossed.  IV Protonix administered.   2.  Coronary artery disease, status post stenting to his LAD last back in .  He has been asymptomatic from a cardiac standpoint.  No angina, heart failure, arrhythmias.  We will hold his aspirin and Plavix currently.   3.  Hypertension.  He is on losartan and amlodipine.  We will hold both of those.      CODE STATUS:  He is a full code.      Anticipated hospital stay is likely going to be greater than or equal to 2 midnights.         BEN DORADO MD             D: 10/01/2020   T: 10/01/2020   MT: LUKE      Name:     PAPO ALCANTARA   MRN:      [id number]        Account:      [id number]   :      1955        Admitted:     10/01/2020                   Document: [id number]       cc: Bradley Way MD

## 2020-10-01 NOTE — ED NOTES
"Pt ambulatory to ED room 5 with c/o dizziness and diaphoresis. Pt states that yesterday he went to see his PCP with c/o dark stools. Pt states his hgb was 10 and he was told to take his Carafate q2h. Pt states that since he woke up this AM he has had two stools of \"almost all blood.\" Pt denies abd pain and n/v/d. Pallor noted throughout with cap refill 10s to UE. IV placed, EKG performed, and monitors applied. Pt states that he has had a hx of bleeding in the past and was told that the bleeding was coming from a \"vein in his esophagus.\"   "

## 2020-10-01 NOTE — PROGRESS NOTES
Patient's endoscopy revealed no obvious active bleeding or source in his stomach.  We will continue to monitor his hemoglobin transfuse to make sure he stable if not actively bleeding once hemoglobin stabilizes feed with probable discharge.  If he has any obvious recurrent active bleeding may require tagged red cell study.

## 2020-10-01 NOTE — ED NOTES
DATE:  10/1/2020   TIME OF RECEIPT FROM LAB:  0608  LAB TEST:  lactic  LAB VALUE:  2.7  RESULTS GIVEN WITH READ-BACK TO (PROVIDER):  Dr. Ledesma  TIME LAB VALUE REPORTED TO PROVIDER:   0608

## 2020-10-02 VITALS
OXYGEN SATURATION: 97 % | SYSTOLIC BLOOD PRESSURE: 141 MMHG | RESPIRATION RATE: 18 BRPM | HEART RATE: 80 BPM | DIASTOLIC BLOOD PRESSURE: 88 MMHG | HEIGHT: 66 IN | WEIGHT: 167.11 LBS | TEMPERATURE: 98.6 F | BODY MASS INDEX: 26.86 KG/M2

## 2020-10-02 LAB
ALBUMIN SERPL-MCNC: 2.5 G/DL (ref 3.4–5)
ALP SERPL-CCNC: 62 U/L (ref 40–150)
ALT SERPL W P-5'-P-CCNC: 30 U/L (ref 0–70)
ANION GAP SERPL CALCULATED.3IONS-SCNC: 4 MMOL/L (ref 3–14)
AST SERPL W P-5'-P-CCNC: 28 U/L (ref 0–45)
BILIRUB SERPL-MCNC: 0.3 MG/DL (ref 0.2–1.3)
BUN SERPL-MCNC: 14 MG/DL (ref 7–30)
CALCIUM SERPL-MCNC: 7.6 MG/DL (ref 8.5–10.1)
CHLORIDE SERPL-SCNC: 111 MMOL/L (ref 94–109)
CO2 SERPL-SCNC: 27 MMOL/L (ref 20–32)
CREAT SERPL-MCNC: 0.69 MG/DL (ref 0.66–1.25)
ERYTHROCYTE [DISTWIDTH] IN BLOOD BY AUTOMATED COUNT: 13.6 % (ref 10–15)
GFR SERPL CREATININE-BSD FRML MDRD: >90 ML/MIN/{1.73_M2}
GLUCOSE SERPL-MCNC: 90 MG/DL (ref 70–99)
HCT VFR BLD AUTO: 23.9 % (ref 40–53)
HGB BLD-MCNC: 6.5 G/DL (ref 13.3–17.7)
HGB BLD-MCNC: 8.3 G/DL (ref 13.3–17.7)
HGB BLD-MCNC: 8.4 G/DL (ref 13.3–17.7)
HGB BLD-MCNC: 9.2 G/DL (ref 13.3–17.7)
MCH RBC QN AUTO: 31.8 PG (ref 26.5–33)
MCHC RBC AUTO-ENTMCNC: 34.7 G/DL (ref 31.5–36.5)
MCV RBC AUTO: 92 FL (ref 78–100)
PLATELET # BLD AUTO: 182 10E9/L (ref 150–450)
POTASSIUM SERPL-SCNC: 3.9 MMOL/L (ref 3.4–5.3)
PROT SERPL-MCNC: 4.7 G/DL (ref 6.8–8.8)
RBC # BLD AUTO: 2.61 10E12/L (ref 4.4–5.9)
SODIUM SERPL-SCNC: 142 MMOL/L (ref 133–144)
WBC # BLD AUTO: 5.1 10E9/L (ref 4–11)

## 2020-10-02 PROCEDURE — G0378 HOSPITAL OBSERVATION PER HR: HCPCS

## 2020-10-02 PROCEDURE — 85027 COMPLETE CBC AUTOMATED: CPT | Performed by: SURGERY

## 2020-10-02 PROCEDURE — 36415 COLL VENOUS BLD VENIPUNCTURE: CPT | Performed by: SURGERY

## 2020-10-02 PROCEDURE — C9113 INJ PANTOPRAZOLE SODIUM, VIA: HCPCS | Performed by: SURGERY

## 2020-10-02 PROCEDURE — 80053 COMPREHEN METABOLIC PANEL: CPT | Performed by: SURGERY

## 2020-10-02 PROCEDURE — 250N000011 HC RX IP 250 OP 636: Performed by: SURGERY

## 2020-10-02 PROCEDURE — 85018 HEMOGLOBIN: CPT | Mod: 91 | Performed by: SURGERY

## 2020-10-02 PROCEDURE — 99217 PR OBSERVATION CARE DISCHARGE: CPT | Performed by: INTERNAL MEDICINE

## 2020-10-02 PROCEDURE — 96376 TX/PRO/DX INJ SAME DRUG ADON: CPT

## 2020-10-02 PROCEDURE — 258N000003 HC RX IP 258 OP 636: Performed by: INTERNAL MEDICINE

## 2020-10-02 RX ADMIN — SODIUM CHLORIDE: 9 INJECTION, SOLUTION INTRAVENOUS at 08:20

## 2020-10-02 RX ADMIN — PANTOPRAZOLE SODIUM 40 MG: 40 INJECTION, POWDER, FOR SOLUTION INTRAVENOUS at 08:20

## 2020-10-02 ASSESSMENT — ACTIVITIES OF DAILY LIVING (ADL)
ADLS_ACUITY_SCORE: 16

## 2020-10-02 NOTE — DISCHARGE SUMMARY
[facility]    Discharge Summary  Hospitalist    Date of Admission:  10/1/2020  Date of Discharge:  10/2/2020 10:49 AM  Discharging Provider: Criss Joshi  Date of Service (when I saw the patient): 10/2/20    Discharge Diagnoses   Acute blood loss anemia due to gastrointestinal bleed.    Established coronary artery disease, status post stenting to his LAD  Hypertension    History of Present Illness   Carlos is a 65-year-old gentleman who basically has been in good health.  He has had no major troubles.  He is very active, exercising, etc.  He presented to his primary care clinic yesterday with complaints of feeling somewhat weak and started to have some black stools over the last couple of days.  Has a history of previous ulcers and felt that he probably was bleeding once again.  Been under a fair amount of stress, he states.  At any rate, during that visit yesterday, 09/30, his blood pressure was 110/70, pulse was 102.  Sats were normal.  His exam was unremarkable.  His hemoglobin was 10.5.  He was recommended to continue with his Prilosec and Carafate 4 times a day and that if he had any worsening troubles to seek evaluation; otherwise, follow up hemoglobin in a while.  His last hemoglobin was 13.8 back in 07/2019 and it was 10.0 yesterday.  The patient did well and this morning he started to have a couple episodes of very loose sort of dark blood.  Felt a little shaky, a little lightheaded, and came to the ER for evaluation.  No chest pains, no abnormal shortness of breath at all.  Does take aspirin and Plavix for previous coronary artery disease.  No Advil at all.  Is not a drinker.  When he got here, heart rate was 102.  Afebrile.  Pressure was 120/75, sats 98% on room air.  He had no abdominal pain.  His lab work showed that his hemoglobin was now 7.3, platelet count was 293,000.  INR was 1.02.  BUN and creatinine were 29 and 0.64.  His occult blood was positive.  Lactic acid was 2.7.  The patient  has not eaten since last evening.  They did call Dr. Alfaro, our surgeon on-call, and stated that he would scope the patient.      As I am seeing the patient down in the ER, he is pleasant, alert, oriented, having no real distress at all.  Currently no abdominal pain.  States he has had no real troubles with bleeding or issues for quite some time.  Back in 2015 he had some GI bleeding.  He has had history of a gastric ulcer in the past.  His last round of bleeding, they scoped him from above, saw nothing.  They scoped his colon.  He had blood in his colon, but they could not find anything.  He had a capsule endoscopy, which was not all that exact.  It did show some small bowel diverticula, 2 small nonbleeding AV malformations, a diffuse area of lymphangiectasia.  Since that time he has not had any further episodes and hemoglobin has been stable, per his report.      He has not been exposed to anybody who has had COVID.  He has had no recent fevers, chills, sweats, cough, purulent sputum.  No heartburn.  No current abdominal pain.  No difficulty voiding.  No skin rashes, swelling, joint pains at all.  Has not been traveling.  Otherwise, he has felt well.  He is normally very active, out running.     Hospital Course   Carlos Grider was admitted on 10/1/2020.  The following problems were addressed during his hospitalization:    Acute blood loss anemia due to gastrointestinal bleed.    The patient has had some black stools over the last couple of days and then followed by some dark blood the morning of his admission, somewhat symptomatic.   Hemogobin was 7.3 and had been 10 the day prior.  His usual baseline is in the 12-13 range.  Has had multiple upper GI bleeds in the past; last episode was 2015 of unclear source despite endoscopy, colonoscopy and capsule enteroscopy.  Continues to take aspirin and Plavix which were held upon admission.  His hemoglobin did drop down to 6.5 and he was transfused 2 units pRBCs.  He  did undergo upper endoscopy which was normal with no bleeding sources identified and no biopsies obtained.   He is hemodynamically stable currently.  He will be admitted to the hospital.  Surgery consult has been obtained.   He was monitored overnight and remained hemodynamically stable with hemoglobin now 9.2 upon discharge.  We did discuss that there was a portion of his last capsule endoscopy that had suboptimal imaging toward the latter part of the study and may be reasonable to revisit GI consultation to consider a repeat capsule endoscopy.  If he presents again with active bleeding a tagged RBC scan would be helpful. Patient wishes to discuss the GI consultation with his PCP, so have deferred referral at present and he will follow up with Dr. Way.      2.  Coronary artery disease, status post stenting to his LAD last back in 2010.    He has been asymptomatic from a cardiac standpoint.  No angina, heart failure, arrhythmias.  We did hold his aspirin and Plavix currently. With no active bleeding source found within his stomach or duodenum, patient was advised that he may resume either his aspirin or his plavix, but should avoid dual anti-platelet therapy at this point now that he has had another GI bleeding event.  He is several years out from his stenting and has discussed stopping one of his antiplatelet agents with his cardiologist, Dr. Metz, in the past and was told that it is up to him if he wished to stay on both or discontinue one or the other.  Given the aspirin is a little harder on his stomach by his report, I told him to consider dropping the aspirin and continue with just the Plavix.  He wishes to hold both for now and intends to follow up in short order with his PCP to discuss and then will decide.       3.  Hypertension.    He is on losartan and amlodipine.  Both were held, but will resume now at discharge           Pending Results   Unresulted Labs Ordered in the Past 30 Days of this  Admission     No orders found for last 31 day(s).          Code Status   Full Code       Primary Care Physician   Bradley Way    Physical Exam   Temp: 98.6  F (37  C) Temp src: Tympanic BP: 141/88 Pulse: 80   Resp: 18 SpO2: 97 % O2 Device: None (Room air)    Vitals:    10/01/20 0859   Weight: 75.8 kg (167 lb 1.7 oz)     Vital Signs with Ranges  Temp:  [97.9  F (36.6  C)-98.6  F (37  C)] 98.6  F (37  C)  Pulse:  [64-92] 80  Resp:  [8-22] 18  BP: (106-141)/(58-88) 141/88  SpO2:  [94 %-97 %] 97 %  I/O last 3 completed shifts:  In: 2432 [P.O.:1080; I.V.:1052]  Out: -     Constitutional: Alert and oriented X 3. No distress   Respiratory: CTA bilaterally. No wheezes or ronchi.    Cardiovascular: RRR. No murmurs, rubs, gallops. Normal S1/S2   GI: Soft, NTND, no organomegaly. Bowel sounds present   Integument: Warm, dry   Extremities:  No edema     Discharge Disposition   Discharged to home  Condition at discharge: Stable    Consultations This Hospital Stay   None    Time Spent on this Encounter   I, Criss Joshi MD, personally saw the patient today and spent greater than 30 minutes discharging this patient.    Discharge Orders      Reason for your hospital stay    Acute blood loss anemia secondary to GI bleed     Follow-up and recommended labs and tests     Follow up with primary care provider, Bradley Way, within 7 days for hospital follow- up.  The following labs/tests are recommended: Hemoglobin.     Activity    Your activity upon discharge: activity as tolerated     When to contact your care team    Call your primary doctor if you have any of the following: Recurrent black or bloody stools, dizziness, shortness of breath, weakness or increased fatigue.     Full Code     Diet    Follow this diet upon discharge: Orders Placed This Encounter      Regular Diet Adult     Discharge Medications   Current Discharge Medication List      CONTINUE these medications which have NOT CHANGED    Details   amLODIPine  (NORVASC) 10 MG tablet Take 10 mg by mouth daily       fish oil-omega-3 fatty acids 1000 MG capsule Take 1 g by mouth daily      multivitamin, therapeutic (THERA-VIT) TABS tablet Take 1 tablet by mouth daily      niacin ER (NIASPAN) 500 MG CR tablet Take 500 mg by mouth 2 times daily      pantoprazole (PROTONIX) 40 MG EC tablet Take 40 mg by mouth every morning (before breakfast)       rosuvastatin (CRESTOR) 10 MG tablet Take 10 mg by mouth daily       sucralfate (CARAFATE) 1 GM tablet Take 1 g by mouth 4 times daily on an empty stomach      Testosterone 20.25 MG/1.25GM (1.62%) GEL Place 1 packet onto the skin daily       valsartan (DIOVAN) 80 MG tablet Take 80 mg by mouth daily       nitroGLYcerin (NITROSTAT) 0.4 MG sublingual tablet Place 0.4 mg under the tongue every 5 minutes as needed for chest pain For chest pain place 1 tablet under the tongue every 5 minutes for 3 doses. If symptoms persist 5 minutes after 1st dose call 911.         STOP taking these medications       aspirin 81 MG EC tablet Comments:   Reason for Stopping:         clopidogrel (PLAVIX) 75 MG tablet Comments:   Reason for Stopping:             Allergies   No Known Allergies  Data   Most Recent 3 CBC's:  Recent Labs   Lab Test 10/02/20  0908 10/02/20  0456 10/02/20  0103 10/01/20  0558 10/01/20  0558 09/22/15  1104   WBC  --  5.1  --   --  8.7 9.9   HGB 9.2* 8.3* 8.4*   < > 7.3* 14.1   MCV  --  92  --   --  94 94   PLT  --  182  --   --  293 368    < > = values in this interval not displayed.      Most Recent 3 BMP's:  Recent Labs   Lab Test 10/02/20  0456 10/01/20  0558 09/22/15  1104    140 139   POTASSIUM 3.9 3.8 3.7   CHLORIDE 111* 108 106   CO2 27 26 28   BUN 14 29 19   CR 0.69 0.64* 0.74   ANIONGAP 4 6 5   ANGELO 7.6* 7.3* 8.7   GLC 90 181* 102*     Most Recent 2 LFT's:  Recent Labs   Lab Test 10/02/20  0456 10/01/20  0558   AST 28 25   ALT 30 35   ALKPHOS 62 34*   BILITOTAL 0.3 0.2     Most Recent INR's and Anticoagulation Dosing  History:  Anticoagulation Dose History     Recent Dosing and Labs Latest Ref Rng & Units 3/29/2015 10/1/2020    INR 0.86 - 1.14 1.07 1.02        Most Recent 3 Troponin's:  Recent Labs   Lab Test 09/22/15  1104   TROPI <0.015  The 99th percentile for upper reference range is 0.045 ug/L.  Troponin values in   the range of 0.045 - 0.120 ug/L may be associated with risks of adverse   clinical events.       Most Recent Cholesterol Panel:No lab results found.  Most Recent 6 Bacteria Isolates From Any Culture (See EPIC Reports for Culture Details):No lab results found.  Most Recent TSH, T4 and A1c Labs:No lab results found.  Results for orders placed or performed in visit on 09/22/15   MR Brain w/o & w Contrast    Narrative    StudyCompleted    [facility]  [id number]  [id number]  1  1      0

## 2020-10-02 NOTE — PLAN OF CARE
No acute changes overnight. VSS. 2 loose black stools overnight. Alert and oriented. Denies dizziness. Lungs clear. HgB 8.3 this AM. Denies pain. Slept majority of the shift. Up SBA to the bathroom. IVF at 50. Call light remains within reach and makes needs known. Uneventful night.     Face to face report given with opportunity to observe patient.    Report given to Olive RN and GIGI Flaherty RN   10/2/2020  6:55 AM

## 2020-10-02 NOTE — PLAN OF CARE
"A&O. VS as charted. Afebrile. Denies pain. Reports \"I feel really good compared to yesterday.\" LS clear. BS active. No stools this am. Diet advanced to reg with good tolerance. IV removed for discharge.    Patient discharged at 10:50 AM via ambulation accompanied by staff. Prescriptions - None ordered for discharge. All belongings sent with patient.     Discharge instructions reviewed with Patient. Listed belongings gathered and returned to patient. Yes    Patient discharged to Home.   Report called to N/A    Core Measures and Vaccines  Core Measures applicable during stay: No. If yes, state diagnosis:  GI Bleed  Pneumonia and Influenza given prior to discharge, if indicated: N/A    Surgical Patient   Surgical Procedures during stay: Yes  Did patient receive discharge instruction on wound care and recognition of infection symptoms? N/A    MISC  Follow up appointment made:  Yes  Home and hospital aquired medications returned to patient: N/A  Patient reports pain was well managed at discharge: Yes    "

## 2020-10-02 NOTE — DISCHARGE INSTRUCTIONS
A follow up appointment with Dr Way at the [facility] has been scheduled for you on October 6, 2020 at 8:30 am. Please arrive a few minutes early for check in. If you need to reschedule please call [phone].

## 2020-10-02 NOTE — PROGRESS NOTES
Care Transitions focused note:      Medical record and Rick Score reviewed. Participated in interdisciplinary rounds.  No apparent needs noted at this time. Care Transitions will remain available if needs arise.  Went over Medicare Letter with Carlos.  States he is independent at home.

## 2020-10-06 ENCOUNTER — TELEPHONE (OUTPATIENT)
Dept: CASE MANAGEMENT | Facility: HOSPITAL | Age: 65
End: 2020-10-06

## 2021-12-08 ENCOUNTER — APPOINTMENT (OUTPATIENT)
Dept: ULTRASOUND IMAGING | Facility: HOSPITAL | Age: 66
End: 2021-12-08
Attending: NURSE PRACTITIONER
Payer: COMMERCIAL

## 2021-12-08 ENCOUNTER — HOSPITAL ENCOUNTER (EMERGENCY)
Facility: HOSPITAL | Age: 66
Discharge: HOME | End: 2021-12-08
Attending: NURSE PRACTITIONER | Admitting: NURSE PRACTITIONER
Payer: COMMERCIAL

## 2021-12-08 VITALS
TEMPERATURE: 97.4 F | HEART RATE: 78 BPM | OXYGEN SATURATION: 96 % | RESPIRATION RATE: 16 BRPM | SYSTOLIC BLOOD PRESSURE: 151 MMHG | DIASTOLIC BLOOD PRESSURE: 82 MMHG

## 2021-12-08 DIAGNOSIS — M79.662 PAIN OF LEFT LOWER LEG: Primary | ICD-10-CM

## 2021-12-08 PROCEDURE — G0463 HOSPITAL OUTPT CLINIC VISIT: HCPCS | Mod: 25

## 2021-12-08 PROCEDURE — G0463 HOSPITAL OUTPT CLINIC VISIT: HCPCS

## 2021-12-08 PROCEDURE — 93971 EXTREMITY STUDY: CPT | Mod: LT

## 2021-12-08 PROCEDURE — 99213 OFFICE O/P EST LOW 20 MIN: CPT | Performed by: NURSE PRACTITIONER

## 2021-12-08 ASSESSMENT — ENCOUNTER SYMPTOMS
NAUSEA: 0
DIARRHEA: 0
VOMITING: 0
COLOR CHANGE: 0
MYALGIAS: 1
CHILLS: 0
FEVER: 0
SHORTNESS OF BREATH: 0
NUMBNESS: 0
PSYCHIATRIC NEGATIVE: 1
WEAKNESS: 0
WOUND: 0

## 2021-12-08 NOTE — DISCHARGE INSTRUCTIONS
Alternate Tylenol and ibuprofen as needed for pain.    Follow-up with primary care provider or return to urgent care-ED with any worsening in condition or additional concerns

## 2021-12-08 NOTE — ED TRIAGE NOTES
Pt presents with c/o left upper inner thigh up to groin. Pain started 2 days ago. Called [facility] and was told to come here for an Ultrasound to rule out dvt.

## 2021-12-08 NOTE — ED PROVIDER NOTES
History     Chief Complaint   Patient presents with     Leg Pain     HPI  Carlos Grider is a 66 year old male who presents to urgent care today (ambulatory) with complaints of left left pain.  Was previously on a blood thinner (cant remember which one) stopped taking it two years ago.  No previous DVT/PE.  Currently on ASA 81 mg.   Pain currently 2/10 and can increase to 7/10 when sitting on it.  Pain started behind knee and is now in thigh.  Dr. Way sent patient to urgent care for an ultrasound.  Denies any numbness or tingling.  No redness, warmth or swelling.  No falls, injury or trauma.  Denies any previous fractures, dislocations or sugery.  No other concerns.       Allergies:  No Known Allergies    Problem List:    Patient Active Problem List    Diagnosis Date Noted     Gastrointestinal hemorrhage, unspecified gastrointestinal hemorrhage type 10/01/2020     Priority: Medium     Coronary artery disease involving native coronary artery without angina pectoris 10/01/2020     Priority: Medium     Essential hypertension 07/23/2011     Priority: Medium     Overview:   IMO Update 10/11       Coronary atherosclerosis 09/18/2009     Priority: Medium     Overview:   06/13/2010 - coronary angiography is stable with no new obstructive disease, medical therapy recommended; however, patient did not follow through.  09/14/2009 - status post Aydlett drug-eluting stent, 3.0 x 15 mm to the mid LAD (IVUS).   01/2008 - cardiac catheterization with 20 to 30% LAD disease.   12/08/2010 - status post successful placement of an Aydlett drug-eluting stent, 3.0 x 15 mm to the left anterior descending artery.   08/02/2012 - unstable angina, cath with stable ASCAD, no new obstructive disease, and patent stents       Gastric ulcer 07/16/2008     Priority: Medium     Overview:   IMO Update 10/11       Intestinal infection due to Gram-negative bacteria 01/31/2008     Priority: Medium     Cholelithiasis 01/30/2008     Priority:  Medium     Hyperlipidemia 01/30/2008     Priority: Medium     Overview:   IMO Update 10/11          Past Medical History:    Past Medical History:   Diagnosis Date     CAD (coronary artery disease)      GI bleed 2006       Past Surgical History:    Past Surgical History:   Procedure Laterality Date     ESOPHAGOSCOPY, GASTROSCOPY, DUODENOSCOPY (EGD), COMBINED N/A 10/1/2020    Procedure: Upper endoscopy;  Surgeon: Anirudh Alfaro MD;  Location: [facility]       Family History:    No family history on file.    Social History:  Marital Status:   [2]  Social History     Tobacco Use     Smoking status: Never Smoker     Smokeless tobacco: Never Used   Substance Use Topics     Alcohol use: Yes     Comment: occ     Drug use: No        Medications:    amLODIPine (NORVASC) 10 MG tablet  fish oil-omega-3 fatty acids 1000 MG capsule  multivitamin, therapeutic (THERA-VIT) TABS tablet  niacin ER (NIASPAN) 500 MG CR tablet  nitroGLYcerin (NITROSTAT) 0.4 MG sublingual tablet  pantoprazole (PROTONIX) 40 MG EC tablet  rosuvastatin (CRESTOR) 10 MG tablet  sucralfate (CARAFATE) 1 GM tablet  Testosterone 20.25 MG/1.25GM (1.62%) GEL  valsartan (DIOVAN) 80 MG tablet      Review of Systems   Constitutional: Negative for chills and fever.   Respiratory: Negative for shortness of breath.    Cardiovascular: Negative for chest pain.   Gastrointestinal: Negative for diarrhea, nausea and vomiting.   Musculoskeletal: Positive for myalgias. Negative for gait problem.   Skin: Negative for color change, pallor, rash and wound.   Neurological: Negative for weakness and numbness.   Psychiatric/Behavioral: Negative.      Physical Exam   BP: 151/82  Pulse: 78  Temp: 97.4  F (36.3  C)  Resp: 16  SpO2: 96 %    Physical Exam  Vitals and nursing note reviewed.   Constitutional:       General: He is not in acute distress.     Appearance: He is not ill-appearing or toxic-appearing.   Cardiovascular:      Rate and Rhythm: Normal rate and regular  rhythm.      Pulses: Normal pulses.      Heart sounds: Normal heart sounds.   Pulmonary:      Effort: Pulmonary effort is normal.      Breath sounds: Normal breath sounds.   Musculoskeletal:      Left hip: Normal.      Left upper leg: Tenderness present. No swelling, edema, deformity, lacerations or bony tenderness.      Left knee: Normal.      Left ankle: Normal.   Skin:     General: Skin is warm and dry.      Capillary Refill: Capillary refill takes less than 2 seconds.   Neurological:      Mental Status: He is alert.   Psychiatric:         Mood and Affect: Mood normal.       ED Course     Results for orders placed or performed during the hospital encounter of 12/08/21 (from the past 24 hour(s))   US Lower Extremity Venous Duplex Left    Narrative    Exam:US LOWER EXTREMITY VENOUS DUPLEX LEFT    History: 66 years Male left lower extremity pain and swelling    Comparisons: None    Technique: Venous duplex ultrasonography of the left lower extremity  was performed.     Findings: The common femoral vein, superficial femoral vein and  popliteal vein are fully compressible with spontaneous and augmentable  venous flow.           Impression    Impression: No evidence of deep venous thrombosis within the lower  extremity.    DEION XAVIER MD         SYSTEM ID:  [id number]       Medications - No data to display    Assessments & Plan (with Medical Decision Making)     I have reviewed the nursing notes.    I have reviewed the findings, diagnosis, plan and need for follow up with the patient.  (M79.252) Pain of left lower leg  (primary encounter diagnosis)  Plan:   Patient ambulatory with a nontoxic appearance.  Patient primary concern today is to rule out a DVT of left lower extremity.  Ultrasound of left lower extremity venous duplex completed and impression shows no evidence of DVT within the lower extremity.  CMS intact.  Pulses 2+ and equal.  Patient to alternate tylenol and ibuprofen as needed for pain.  Patient  declines any further work-up at this time as patient just wanted to rule out DVT.  Patient to follow-up with primary care provider or return to urgent care-ED with any worsening in condition or additional concerns.    Discharge Medication List as of 12/8/2021  6:29 PM        Final diagnoses:   Pain of left lower leg     12/8/2021   [facility]     Yanely Burger, SCAR  12/08/21 8743

## 2021-12-08 NOTE — ED TRIAGE NOTES
Pt presents with having left leg pain upper inner thigh up to groin for 2 days.  No SOB  Was told to come in to be ruled out for DVT

## 2023-02-28 ENCOUNTER — MEDICAL CORRESPONDENCE (OUTPATIENT)
Dept: MRI IMAGING | Facility: HOSPITAL | Age: 68
End: 2023-02-28

## 2023-03-01 ENCOUNTER — HOSPITAL ENCOUNTER (OUTPATIENT)
Dept: MRI IMAGING | Facility: HOSPITAL | Age: 68
Discharge: HOME | End: 2023-03-01
Attending: ORTHOPAEDIC SURGERY | Admitting: ORTHOPAEDIC SURGERY
Payer: COMMERCIAL

## 2023-03-01 DIAGNOSIS — M25.60 LIMITED JOINT RANGE OF MOTION (ROM): ICD-10-CM

## 2023-03-01 DIAGNOSIS — R53.1 WEAKNESS: ICD-10-CM

## 2023-03-01 DIAGNOSIS — M25.511 RIGHT SHOULDER PAIN: ICD-10-CM

## 2023-03-01 PROCEDURE — 73221 MRI JOINT UPR EXTREM W/O DYE: CPT | Mod: RT

## (undated) DEVICE — CONNECTOR-ERBEFLO 2 PORT

## (undated) DEVICE — IRRIGATION-H2O 1000ML

## (undated) DEVICE — CANISTER-SUCTION 2000CC

## (undated) DEVICE — TUBING-SUCTION 20FT

## (undated) DEVICE — MOUTHPIECE W/GUARD FOR ENDOSCOPY

## (undated) DEVICE — FORCEP-COLON BIOPSY STD W/NEEDLE 160CM

## (undated) DEVICE — SYRINGE-30CC SLIP TIP

## (undated) DEVICE — LUBRICANT JELLY 2OZ. TUBE

## (undated) RX ORDER — PROPOFOL 10 MG/ML
INJECTION, EMULSION INTRAVENOUS
Status: DISPENSED
Start: 2020-10-01

## (undated) RX ORDER — LIDOCAINE HYDROCHLORIDE 20 MG/ML
INJECTION, SOLUTION EPIDURAL; INFILTRATION; INTRACAUDAL; PERINEURAL
Status: DISPENSED
Start: 2020-10-01

## (undated) RX ORDER — FENTANYL CITRATE 50 UG/ML
INJECTION, SOLUTION INTRAMUSCULAR; INTRAVENOUS
Status: DISPENSED
Start: 2020-10-01